# Patient Record
Sex: MALE | Employment: UNEMPLOYED | URBAN - METROPOLITAN AREA
[De-identification: names, ages, dates, MRNs, and addresses within clinical notes are randomized per-mention and may not be internally consistent; named-entity substitution may affect disease eponyms.]

---

## 2023-09-12 ENCOUNTER — OFFICE VISIT (OUTPATIENT)
Age: 58
End: 2023-09-12
Payer: COMMERCIAL

## 2023-09-12 VITALS
TEMPERATURE: 98 F | HEART RATE: 78 BPM | WEIGHT: 232.5 LBS | HEIGHT: 75 IN | BODY MASS INDEX: 28.91 KG/M2 | SYSTOLIC BLOOD PRESSURE: 116 MMHG | DIASTOLIC BLOOD PRESSURE: 86 MMHG

## 2023-09-12 DIAGNOSIS — G11.8: ICD-10-CM

## 2023-09-12 DIAGNOSIS — G40.319 INTRACTABLE GENERALIZED IDIOPATHIC EPILEPSY WITHOUT STATUS EPILEPTICUS (HCC): Primary | ICD-10-CM

## 2023-09-12 DIAGNOSIS — G62.0 DRUG-INDUCED PERIPHERAL NEUROPATHY (HCC): ICD-10-CM

## 2023-09-12 DIAGNOSIS — T65.91XA: ICD-10-CM

## 2023-09-12 DIAGNOSIS — M85.89 OSTEOPENIA OF MULTIPLE SITES: ICD-10-CM

## 2023-09-12 PROCEDURE — 99214 OFFICE O/P EST MOD 30 MIN: CPT | Performed by: PSYCHIATRY & NEUROLOGY

## 2023-09-12 RX ORDER — PREGABALIN 150 MG/1
150 CAPSULE ORAL 2 TIMES DAILY
Qty: 60 CAPSULE | Refills: 3
Start: 2023-09-12 | End: 2024-01-10

## 2023-09-12 RX ORDER — LEVOTHYROXINE SODIUM 0.1 MG/1
100 TABLET ORAL DAILY
COMMUNITY

## 2023-09-12 RX ORDER — NABUMETONE 750 MG/1
TABLET, FILM COATED ORAL
COMMUNITY
Start: 2023-08-21

## 2023-09-12 RX ORDER — PHENOL 1.4 %
1 AEROSOL, SPRAY (ML) MUCOUS MEMBRANE DAILY
COMMUNITY

## 2023-09-12 RX ORDER — PHENYTOIN SODIUM 100 MG/1
CAPSULE, EXTENDED RELEASE ORAL
COMMUNITY
Start: 2023-08-09 | End: 2023-09-12 | Stop reason: SDUPTHER

## 2023-09-12 RX ORDER — PREGABALIN 150 MG/1
150 CAPSULE ORAL 3 TIMES DAILY
Qty: 90 CAPSULE | Refills: 3 | Status: SHIPPED | OUTPATIENT
Start: 2023-09-12 | End: 2023-09-12

## 2023-09-12 RX ORDER — PHENYTOIN SODIUM 100 MG/1
CAPSULE, EXTENDED RELEASE ORAL
Qty: 90 CAPSULE | Refills: 5 | Status: SHIPPED | OUTPATIENT
Start: 2023-09-12

## 2023-09-12 RX ORDER — ARIPIPRAZOLE 10 MG/1
TABLET ORAL
COMMUNITY
Start: 2023-07-26

## 2023-09-12 RX ORDER — ESCITALOPRAM OXALATE 10 MG/1
TABLET ORAL
COMMUNITY
Start: 2023-07-26

## 2023-09-12 RX ORDER — BUSPIRONE HYDROCHLORIDE 5 MG/1
TABLET ORAL
COMMUNITY
Start: 2023-07-26

## 2023-09-12 RX ORDER — BACITRACIN 500 UNIT/G
OINTMENT (GRAM) TOPICAL
COMMUNITY

## 2023-09-12 RX ORDER — FOLIC ACID 1 MG/1
1 TABLET ORAL DAILY
COMMUNITY

## 2023-09-12 RX ORDER — MECLIZINE HYDROCHLORIDE 25 MG/1
25 TABLET ORAL 3 TIMES DAILY PRN
COMMUNITY

## 2023-09-12 RX ORDER — LANOLIN ALCOHOL/MO/W.PET/CERES
1000 CREAM (GRAM) TOPICAL DAILY
COMMUNITY

## 2023-09-12 NOTE — PROGRESS NOTES
Adilene Ayon MD       Jenifer Anderson is a 60 y.o. male presenting as a follow patient for a   Chief Complaint   Patient presents with    Seizures      Epilepsy: Onset:   Progression: 2-3 seizures within a year. But 13-14 years seizure free since then. Diagnosis made of epilepsy: yes  Seizure type: generalized tonic clonic seizure. Etiology: unknown  Breakthrough seizure:  2023: was walking on his own  Was vision distorted  Was walking on 1 side of street. Lost time for 20 minutes. Could not place himself  Had to call his sister- to find out where he was      Date of last event:2023   Event was triggered by: Unknown  Auras: Yes, severe dizziness. Feels vertigo. Fells vision feels distorted, white spots in vision. Time after aura till event: within seconds  Description of event:   LOC. Passes out. Becomes stiffened. Face is all red. Loss of consciousness: Yes, 10 minutes  Tongue biting: No  Urinary incontinence: No  Post ictal symptoms: tired. Denies vomiting, sweatiness. Recollection of event: No  Duration of post ictal symptom: an hour of tiredness      Treatments tried: Dilantin brand name 100 mg qam and 200 mg qhs   oscal 600 mg q daily   lamictal 100 mg bid- by mistake decreased to 100 mg q daily and then from today 50 mg q daily     Side effects: neuropathy, ataxia     Interest in Interventions: none  Other treatments: failed keppra, vimpat, failed generic phenytoin. now tried lamictal - ha/dizziness/nausea     W/u:  Ee2019: intermittent generalized sharp wave discharges. Complications when patient's mother was pregnant or during delivery: None  History of  seizures: None   History of childhood seizures: None  Family history of seizures: None  History of head trauma: None  Prior history of seizure: yes since   History of Meningitis: None   History of Encephalitis: None  ETOH usage: drinks rarely. Illicit drug usage: Never used.